# Patient Record
Sex: FEMALE | Race: WHITE | NOT HISPANIC OR LATINO | Employment: PART TIME | URBAN - METROPOLITAN AREA
[De-identification: names, ages, dates, MRNs, and addresses within clinical notes are randomized per-mention and may not be internally consistent; named-entity substitution may affect disease eponyms.]

---
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
## 2023-07-10 ENCOUNTER — TELEPHONE (OUTPATIENT)
Dept: FAMILY MEDICINE | Facility: CLINIC | Age: 23
End: 2023-07-10

## 2023-07-10 ENCOUNTER — OFFICE VISIT (OUTPATIENT)
Dept: FAMILY MEDICINE | Facility: CLINIC | Age: 23
End: 2023-07-10
Payer: COMMERCIAL

## 2023-07-10 VITALS
OXYGEN SATURATION: 99 % | DIASTOLIC BLOOD PRESSURE: 79 MMHG | SYSTOLIC BLOOD PRESSURE: 127 MMHG | BODY MASS INDEX: 17.52 KG/M2 | WEIGHT: 95.2 LBS | HEART RATE: 68 BPM | TEMPERATURE: 98.1 F | RESPIRATION RATE: 16 BRPM | HEIGHT: 62 IN

## 2023-07-10 DIAGNOSIS — B37.31 YEAST INFECTION OF THE VAGINA: ICD-10-CM

## 2023-07-10 DIAGNOSIS — Z11.59 NEED FOR HEPATITIS C SCREENING TEST: ICD-10-CM

## 2023-07-10 DIAGNOSIS — B00.1 COLD SORE: Primary | ICD-10-CM

## 2023-07-10 DIAGNOSIS — Z11.3 SCREENING FOR STDS (SEXUALLY TRANSMITTED DISEASES): ICD-10-CM

## 2023-07-10 LAB
CLUE CELLS: ABNORMAL
HCG UR QL: NEGATIVE
TRICHOMONAS, WET PREP: ABNORMAL
WBC'S/HIGH POWER FIELD, WET PREP: ABNORMAL
YEAST, WET PREP: PRESENT

## 2023-07-10 PROCEDURE — 86780 TREPONEMA PALLIDUM: CPT | Performed by: PHYSICIAN ASSISTANT

## 2023-07-10 PROCEDURE — 87210 SMEAR WET MOUNT SALINE/INK: CPT | Performed by: PHYSICIAN ASSISTANT

## 2023-07-10 PROCEDURE — 86696 HERPES SIMPLEX TYPE 2 TEST: CPT | Performed by: PHYSICIAN ASSISTANT

## 2023-07-10 PROCEDURE — 36415 COLL VENOUS BLD VENIPUNCTURE: CPT | Performed by: PHYSICIAN ASSISTANT

## 2023-07-10 PROCEDURE — 87491 CHLMYD TRACH DNA AMP PROBE: CPT | Performed by: PHYSICIAN ASSISTANT

## 2023-07-10 PROCEDURE — 87389 HIV-1 AG W/HIV-1&-2 AB AG IA: CPT | Performed by: PHYSICIAN ASSISTANT

## 2023-07-10 PROCEDURE — 86803 HEPATITIS C AB TEST: CPT | Performed by: PHYSICIAN ASSISTANT

## 2023-07-10 PROCEDURE — 87591 N.GONORRHOEAE DNA AMP PROB: CPT | Performed by: PHYSICIAN ASSISTANT

## 2023-07-10 PROCEDURE — 81025 URINE PREGNANCY TEST: CPT | Performed by: PHYSICIAN ASSISTANT

## 2023-07-10 PROCEDURE — 99204 OFFICE O/P NEW MOD 45 MIN: CPT | Performed by: PHYSICIAN ASSISTANT

## 2023-07-10 PROCEDURE — 86695 HERPES SIMPLEX TYPE 1 TEST: CPT | Performed by: PHYSICIAN ASSISTANT

## 2023-07-10 RX ORDER — VALACYCLOVIR HYDROCHLORIDE 1 G/1
2000 TABLET, FILM COATED ORAL 2 TIMES DAILY
Qty: 4 TABLET | Refills: 0 | Status: SHIPPED | OUTPATIENT
Start: 2023-07-10 | End: 2023-07-11

## 2023-07-10 RX ORDER — FLUCONAZOLE 150 MG/1
150 TABLET ORAL ONCE
Qty: 1 TABLET | Refills: 0 | Status: SHIPPED | OUTPATIENT
Start: 2023-07-10 | End: 2023-07-12

## 2023-07-10 ASSESSMENT — PAIN SCALES - GENERAL: PAINLEVEL: NO PAIN (0)

## 2023-07-10 NOTE — PROGRESS NOTES
Assessment & Plan     (B00.1) Cold sore  (primary encounter diagnosis)  Comment: Cold sore.  Discussed with patient Valtrex treatment.  She has not use of this medication previously.  Discussed following up with primary care for ongoing prophylactic medications.  Patient was interested in HSV testing given she had new partner with possible genital herpes exposure.  I discussed with the patient indications for HSV1 versus HSV-2 testing and potential results could still be positive for HSV-2 given cold sore.  Patient reports understanding.  Plan: valACYclovir (VALTREX) 1000 mg tablet          (Z11.3) Screening for STDs (sexually transmitted diseases)  Comment: Discussed screening STIs.  Patient denies any new vaginal discharge or bleeding.  No new skin lesions or sores.  Is otherwise been feeling well.  No urinary symptoms.  Denies possibility of pregnancy.  Plan: NEISSERIA GONORRHOEA PCR, CHLAMYDIA TRACHOMATIS        PCR, Treponema Abs w Reflex to RPR and Titer,         HIV Antigen Antibody Combo, Wet prep - lab         collect, Herpes Simplex Virus 1 and 2 IgG, HCG         qualitative urine          (Z11.59) Need for hepatitis C screening test  Comment: Discussed hep C screening  Plan: Hepatitis C Screen Reflex to HCV RNA Quant and         Genotype           Delmer Rivera PA-C  [facility]   Henna is a 23 year old, presenting for the following health issues:  STD (Pt said that her new partner ex tested positive for genital herpes. )         No data to display              STD    History of Present Illness       Reason for visit:  Std check    She eats 2-3 servings of fruits and vegetables daily.She consumes 2 sweetened beverage(s) daily.She exercises with enough effort to increase her heart rate 30 to 60 minutes per day.  She exercises with enough effort to increase her heart rate 5 days per week.   She is taking medications regularly.     Patient with right upper lip cold  "sore.  Notes she has had 3 prior flares of this.  Normally uses over-the-counter regimens with cold sores lasting 4 to 5 days.  Does endorse a recent new sexual partner.  Partner had prior exposures to genital herpes.  Patient is concerned about this.  She has not had any new genital lesions or rash.  Denies any dysuria, hematuria, possibility of pregnancy.  No abnormal vaginal discharge or bleeding.  No fevers.  Has otherwise been feeling well.  Patient would like screening for STIs.  Patient expressed desire for HSV testing.          Review of Systems   Constitutional, HEENT, cardiovascular, pulmonary, gi and gu systems are negative, except as otherwise noted.      Objective    /79   Pulse 68   Temp 98.1  F (36.7  C) (Tympanic)   Resp 16   Ht 1.575 m (5' 2\")   Wt 43.2 kg (95 lb 3.2 oz)   SpO2 99%   BMI 17.41 kg/m    Body mass index is 17.41 kg/m .  Physical Exam   GENERAL: alert, no distress and LOW BMI  HENT: normal cephalic/atraumatic, nose and mouth without ulcers or lesions and right upper lip with 3 mm raised lesion consistent with cold sore  NECK: no adenopathy, no asymmetry, masses, or scars and thyroid normal to palpation  RESP: lungs clear to auscultation - no rales, rhonchi or wheezes  CV: regular rate and rhythm, normal S1 S2, no S3 or S4, no murmur, click or rub, no peripheral edema and peripheral pulses strong  ABDOMEN: soft, nontender, no hepatosplenomegaly, no masses and bowel sounds normal  MS: no gross musculoskeletal defects noted, no edema  PSYCH: mentation appears normal, affect normal/bright                    "

## 2023-07-10 NOTE — LETTER
July 11, 2023      Henna Mario  [address]   [place] [address]        Ms. Mario,     HSV type I positive.  As discussed in clinic this likely relates to your cold sore but experiencing.     Negative HIV as well as hepatitis C testing.     No evidence of syphilis.     Negative gonorrhea and Chlamydia testing.     If any further questions or concerns please reach out to the clinic.     Sincerely,     Delmer Rivera PA-C     Resulted Orders   NEISSERIA GONORRHOEA PCR   Result Value Ref Range    Neisseria gonorrhoeae Negative Negative      Comment:      Negative for N. gonorrhoeae rRNA by transcription mediated amplification. A negative result by transcription mediated amplification does not preclude the presence of C. trachomatis infection because results are dependent on proper and adequate collection, absence of inhibitors and sufficient rRNA to be detected.   CHLAMYDIA TRACHOMATIS PCR   Result Value Ref Range    Chlamydia trachomatis Negative Negative      Comment:      A negative result by transcription mediated amplification does not preclude the presence of C. trachomatis infection because results are dependent on proper and adequate collection, absence of inhibitors and sufficient rRNA to be detected.   Hepatitis C Screen Reflex to HCV RNA Quant and Genotype   Result Value Ref Range    Hepatitis C Antibody Nonreactive Nonreactive    Narrative    Assay performance characteristics have not been established for newborns, infants, and children.   Treponema Abs w Reflex to RPR and Titer   Result Value Ref Range    Treponema Antibody Total Nonreactive Nonreactive   HIV Antigen Antibody Combo   Result Value Ref Range    HIV Antigen Antibody Combo Nonreactive Nonreactive      Comment:      HIV-1 p24 Ag & HIV-1/HIV-2 Ab Not Detected   Wet prep - lab collect   Result Value Ref Range    Trichomonas Absent Absent    Yeast Present (A) Absent    Clue Cells Absent Absent    WBCs/high power field 1+ (A)  None   Herpes Simplex Virus 1 and 2 IgG   Result Value Ref Range    HSV Type 1 IgG Instrument Value 31.30 (H) <0.90 Index    Herpes Simplex Virus Type 1 IgG Antibody Positive.  IgG antibody to HSV-1 detected. (A) No HSV-1 IgG antibodies detected    HSV Type 2 IgG Instrument Value 0.05 <0.90 Index    Herpes Simplex Virus Type 2 IgG Antibody No HSV-2 IgG antibodies detected. No HSV-2 IgG antibodies detected   HCG qualitative urine   Result Value Ref Range    hCG Urine Qualitative Negative Negative      Comment:      This test is for screening purposes.  Results should be interpreted along with the clinical picture.  Confirmation testing is available if warranted by ordering [id number], HCG Quantitative Pregnancy.

## 2023-07-10 NOTE — TELEPHONE ENCOUNTER
Called patient and reached voicemail. Patient voicemail box is full. Will try patient at a later time with provider message below.    Judith Carrero RN

## 2023-07-10 NOTE — TELEPHONE ENCOUNTER
----- Message from Delmer Rivera PA-C sent at 7/10/2023 12:57 PM CDT -----  Please call patient,     Wet prep shows evidence of yeast.  I have sent to the pharmacy a one-time dose of fluconazole.  The other labs are still in process.     Delmer Rivera PA-C

## 2023-07-11 LAB
C TRACH DNA SPEC QL NAA+PROBE: NEGATIVE
HCV AB SERPL QL IA: NONREACTIVE
HIV 1+2 AB+HIV1 P24 AG SERPL QL IA: NONREACTIVE
HSV1 IGG SERPL QL IA: 31.3 INDEX
HSV1 IGG SERPL QL IA: ABNORMAL
HSV2 IGG SERPL QL IA: 0.05 INDEX
HSV2 IGG SERPL QL IA: ABNORMAL
N GONORRHOEA DNA SPEC QL NAA+PROBE: NEGATIVE
T PALLIDUM AB SER QL: NONREACTIVE

## 2023-07-11 NOTE — TELEPHONE ENCOUNTER
Attempted to reach pt to relay results and plan per provider in note below. There was no answer. Patient voicemail box is full. Unable to leave a message.    MARLA GarrettN, RN

## 2023-07-12 RX ORDER — FLUCONAZOLE 150 MG/1
150 TABLET ORAL ONCE
Qty: 1 TABLET | Refills: 0 | Status: SHIPPED | OUTPATIENT
Start: 2023-07-12 | End: 2023-07-12

## 2023-07-12 NOTE — TELEPHONE ENCOUNTER
Patient notified of provider's message as written below. She would like it sent to a different pharmacy due to insurance. This has been done and the patient is aware. Parent verbalized good understanding, had no further questions and needed no further support.Radha Coles R.N.

## 2023-07-12 NOTE — TELEPHONE ENCOUNTER
[facility] message sent to patient.   Monitor to make sure patient reads it. Other lab results were mailed to patient yesterday in a letter.     Vi GUTIÉRREZN, RN

## 2023-07-16 ENCOUNTER — HEALTH MAINTENANCE LETTER (OUTPATIENT)
Age: 23
End: 2023-07-16

## 2023-12-18 ENCOUNTER — LAB REQUISITION (OUTPATIENT)
Dept: LAB | Facility: CLINIC | Age: 23
End: 2023-12-18
Payer: COMMERCIAL

## 2023-12-18 DIAGNOSIS — O20.0 THREATENED ABORTION: ICD-10-CM

## 2024-01-11 ENCOUNTER — E-VISIT (OUTPATIENT)
Dept: URGENT CARE | Facility: CLINIC | Age: 24
End: 2024-01-11
Payer: COMMERCIAL

## 2024-01-11 DIAGNOSIS — R30.0 DIFFICULT OR PAINFUL URINATION: Primary | ICD-10-CM

## 2024-01-11 PROCEDURE — 99207 PR NON-BILLABLE SERV PER CHARTING: CPT | Performed by: NURSE PRACTITIONER

## 2024-01-11 NOTE — PATIENT INSTRUCTIONS
Dear Henna Mario,     After reviewing your responses, I would like you to come in for a urine test to make sure we treat you correctly. This urine test is to evaluate you for a possible urinary tract infection, and should be scheduled for today or tomorrow. Schedule a Lab Only appointment here.     Lab appointments are not available at most locations on the weekends. If no Lab Only appointment is available, you should be seen in any of our convenient Walk-in or Urgent Care Centers, which can be found on our website here.     You will receive instructions with your results in [facility] once they are available.     If your symptoms worsen, you develop pain in your back or stomach, develop fevers, or are not improving in 5 days, please contact your primary care provider for an appointment or visit a Walk-in or Urgent Care Center to be seen.     Thanks again for choosing us as your health care partner,     Lary Rachel, CNP

## 2024-09-08 ENCOUNTER — HEALTH MAINTENANCE LETTER (OUTPATIENT)
Age: 24
End: 2024-09-08